# Patient Record
Sex: FEMALE | Race: WHITE | HISPANIC OR LATINO | Employment: OTHER | URBAN - METROPOLITAN AREA
[De-identification: names, ages, dates, MRNs, and addresses within clinical notes are randomized per-mention and may not be internally consistent; named-entity substitution may affect disease eponyms.]

---

## 2024-09-01 ENCOUNTER — HOSPITAL ENCOUNTER (EMERGENCY)
Facility: HOSPITAL | Age: 74
Discharge: HOME | End: 2024-09-02
Attending: EMERGENCY MEDICINE
Payer: COMMERCIAL

## 2024-09-01 ENCOUNTER — APPOINTMENT (OUTPATIENT)
Dept: GENERAL RADIOLOGY | Facility: HOSPITAL | Age: 74
End: 2024-09-01
Payer: COMMERCIAL

## 2024-09-01 VITALS
TEMPERATURE: 98.8 F | SYSTOLIC BLOOD PRESSURE: 142 MMHG | RESPIRATION RATE: 17 BRPM | OXYGEN SATURATION: 95 % | HEIGHT: 60 IN | BODY MASS INDEX: 28.27 KG/M2 | WEIGHT: 144 LBS | DIASTOLIC BLOOD PRESSURE: 79 MMHG | HEART RATE: 103 BPM

## 2024-09-01 DIAGNOSIS — R09.02 HYPOXEMIA: ICD-10-CM

## 2024-09-01 DIAGNOSIS — U07.1 COVID-19: ICD-10-CM

## 2024-09-01 DIAGNOSIS — R91.8 PULMONARY INFILTRATES: ICD-10-CM

## 2024-09-01 DIAGNOSIS — J44.1 ACUTE EXACERBATION OF CHRONIC OBSTRUCTIVE PULMONARY DISEASE (COPD): Primary | ICD-10-CM

## 2024-09-01 LAB
ALBUMIN SERPL-MCNC: 4.3 G/DL (ref 3.5–5.2)
ALBUMIN/GLOB SERPL: 1 G/DL
ALP SERPL-CCNC: 72 U/L (ref 39–117)
ALT SERPL W P-5'-P-CCNC: 7 U/L (ref 1–33)
ANION GAP SERPL CALCULATED.3IONS-SCNC: 13.4 MMOL/L (ref 5–15)
AST SERPL-CCNC: 10 U/L (ref 1–32)
B PARAPERT DNA SPEC QL NAA+PROBE: NOT DETECTED
B PERT DNA SPEC QL NAA+PROBE: NOT DETECTED
BASOPHILS # BLD AUTO: 0.04 10*3/MM3 (ref 0–0.2)
BASOPHILS NFR BLD AUTO: 0.3 % (ref 0–1.5)
BILIRUB SERPL-MCNC: 0.4 MG/DL (ref 0–1.2)
BUN SERPL-MCNC: 18 MG/DL (ref 8–23)
BUN/CREAT SERPL: 17.3 (ref 7–25)
C PNEUM DNA NPH QL NAA+NON-PROBE: NOT DETECTED
CALCIUM SPEC-SCNC: 10.9 MG/DL (ref 8.6–10.5)
CHLORIDE SERPL-SCNC: 95 MMOL/L (ref 98–107)
CO2 SERPL-SCNC: 27.6 MMOL/L (ref 22–29)
CREAT SERPL-MCNC: 1.04 MG/DL (ref 0.57–1)
D-LACTATE SERPL-SCNC: 2.7 MMOL/L (ref 0.5–2)
D-LACTATE SERPL-SCNC: 2.7 MMOL/L (ref 0.5–2)
DEPRECATED RDW RBC AUTO: 46.5 FL (ref 37–54)
EGFRCR SERPLBLD CKD-EPI 2021: 57.2 ML/MIN/1.73
EOSINOPHIL # BLD AUTO: 0.5 10*3/MM3 (ref 0–0.4)
EOSINOPHIL NFR BLD AUTO: 3.7 % (ref 0.3–6.2)
ERYTHROCYTE [DISTWIDTH] IN BLOOD BY AUTOMATED COUNT: 13.6 % (ref 12.3–15.4)
FLUAV SUBTYP SPEC NAA+PROBE: NOT DETECTED
FLUAV SUBTYP SPEC NAA+PROBE: NOT DETECTED
FLUBV RNA ISLT QL NAA+PROBE: NOT DETECTED
FLUBV RNA ISLT QL NAA+PROBE: NOT DETECTED
GLOBULIN UR ELPH-MCNC: 4.4 GM/DL
GLUCOSE SERPL-MCNC: 127 MG/DL (ref 65–99)
HADV DNA SPEC NAA+PROBE: NOT DETECTED
HCOV 229E RNA SPEC QL NAA+PROBE: NOT DETECTED
HCOV HKU1 RNA SPEC QL NAA+PROBE: NOT DETECTED
HCOV NL63 RNA SPEC QL NAA+PROBE: NOT DETECTED
HCOV OC43 RNA SPEC QL NAA+PROBE: NOT DETECTED
HCT VFR BLD AUTO: 40.8 % (ref 34–46.6)
HGB BLD-MCNC: 12.6 G/DL (ref 12–15.9)
HMPV RNA NPH QL NAA+NON-PROBE: NOT DETECTED
HPIV1 RNA ISLT QL NAA+PROBE: NOT DETECTED
HPIV2 RNA SPEC QL NAA+PROBE: NOT DETECTED
HPIV3 RNA NPH QL NAA+PROBE: NOT DETECTED
HPIV4 P GENE NPH QL NAA+PROBE: NOT DETECTED
IMM GRANULOCYTES # BLD AUTO: 0.1 10*3/MM3 (ref 0–0.05)
IMM GRANULOCYTES NFR BLD AUTO: 0.7 % (ref 0–0.5)
LYMPHOCYTES # BLD AUTO: 3.99 10*3/MM3 (ref 0.7–3.1)
LYMPHOCYTES NFR BLD AUTO: 29.4 % (ref 19.6–45.3)
M PNEUMO IGG SER IA-ACNC: NOT DETECTED
MCH RBC QN AUTO: 27.9 PG (ref 26.6–33)
MCHC RBC AUTO-ENTMCNC: 30.9 G/DL (ref 31.5–35.7)
MCV RBC AUTO: 90.3 FL (ref 79–97)
MONOCYTES # BLD AUTO: 0.68 10*3/MM3 (ref 0.1–0.9)
MONOCYTES NFR BLD AUTO: 5 % (ref 5–12)
NEUTROPHILS NFR BLD AUTO: 60.9 % (ref 42.7–76)
NEUTROPHILS NFR BLD AUTO: 8.28 10*3/MM3 (ref 1.7–7)
PLATELET # BLD AUTO: 409 10*3/MM3 (ref 140–450)
PMV BLD AUTO: 9 FL (ref 6–12)
POTASSIUM SERPL-SCNC: 3.9 MMOL/L (ref 3.5–5.2)
PROCALCITONIN SERPL-MCNC: 0.09 NG/ML (ref 0–0.25)
PROT SERPL-MCNC: 8.7 G/DL (ref 6–8.5)
RBC # BLD AUTO: 4.52 10*6/MM3 (ref 3.77–5.28)
RHINOVIRUS RNA SPEC NAA+PROBE: NOT DETECTED
RSV RNA NPH QL NAA+NON-PROBE: NOT DETECTED
SARS-COV-2 RNA NPH QL NAA+NON-PROBE: DETECTED
SARS-COV-2 RNA RESP QL NAA+PROBE: DETECTED
SODIUM SERPL-SCNC: 136 MMOL/L (ref 136–145)
STREP A PCR: NOT DETECTED
WBC NRBC COR # BLD AUTO: 13.59 10*3/MM3 (ref 3.4–10.8)

## 2024-09-01 PROCEDURE — 99285 EMERGENCY DEPT VISIT HI MDM: CPT

## 2024-09-01 PROCEDURE — 71046 X-RAY EXAM CHEST 2 VIEWS: CPT

## 2024-09-01 PROCEDURE — 99285 EMERGENCY DEPT VISIT HI MDM: CPT | Performed by: PHYSICIAN ASSISTANT

## 2024-09-01 PROCEDURE — 85025 COMPLETE CBC W/AUTO DIFF WBC: CPT | Performed by: PHYSICIAN ASSISTANT

## 2024-09-01 PROCEDURE — 96361 HYDRATE IV INFUSION ADD-ON: CPT

## 2024-09-01 PROCEDURE — 87636 SARSCOV2 & INF A&B AMP PRB: CPT | Performed by: EMERGENCY MEDICINE

## 2024-09-01 PROCEDURE — 96375 TX/PRO/DX INJ NEW DRUG ADDON: CPT

## 2024-09-01 PROCEDURE — 25810000003 SODIUM CHLORIDE 0.9 % SOLUTION: Performed by: PHYSICIAN ASSISTANT

## 2024-09-01 PROCEDURE — 36415 COLL VENOUS BLD VENIPUNCTURE: CPT

## 2024-09-01 PROCEDURE — 80053 COMPREHEN METABOLIC PANEL: CPT | Performed by: PHYSICIAN ASSISTANT

## 2024-09-01 PROCEDURE — 83605 ASSAY OF LACTIC ACID: CPT | Performed by: PHYSICIAN ASSISTANT

## 2024-09-01 PROCEDURE — 0202U NFCT DS 22 TRGT SARS-COV-2: CPT | Performed by: PHYSICIAN ASSISTANT

## 2024-09-01 PROCEDURE — 25810000003 SODIUM CHLORIDE 0.9 % SOLUTION 250 ML FLEX CONT: Performed by: PHYSICIAN ASSISTANT

## 2024-09-01 PROCEDURE — 84145 PROCALCITONIN (PCT): CPT | Performed by: PHYSICIAN ASSISTANT

## 2024-09-01 PROCEDURE — 87651 STREP A DNA AMP PROBE: CPT | Performed by: EMERGENCY MEDICINE

## 2024-09-01 PROCEDURE — 25010000002 AZITHROMYCIN PER 500 MG: Performed by: PHYSICIAN ASSISTANT

## 2024-09-01 PROCEDURE — 96365 THER/PROPH/DIAG IV INF INIT: CPT

## 2024-09-01 PROCEDURE — 87040 BLOOD CULTURE FOR BACTERIA: CPT | Performed by: PHYSICIAN ASSISTANT

## 2024-09-01 PROCEDURE — 25010000002 DEXAMETHASONE SODIUM PHOSPHATE 10 MG/ML SOLUTION: Performed by: PHYSICIAN ASSISTANT

## 2024-09-01 RX ORDER — DEXAMETHASONE SODIUM PHOSPHATE 10 MG/ML
6 INJECTION, SOLUTION INTRAMUSCULAR; INTRAVENOUS ONCE
Status: COMPLETED | OUTPATIENT
Start: 2024-09-01 | End: 2024-09-01

## 2024-09-01 RX ORDER — IPRATROPIUM BROMIDE AND ALBUTEROL SULFATE 2.5; .5 MG/3ML; MG/3ML
3 SOLUTION RESPIRATORY (INHALATION) ONCE
Status: COMPLETED | OUTPATIENT
Start: 2024-09-01 | End: 2024-09-01

## 2024-09-01 RX ORDER — ALBUTEROL SULFATE 0.83 MG/ML
2.5 SOLUTION RESPIRATORY (INHALATION) ONCE
Status: COMPLETED | OUTPATIENT
Start: 2024-09-01 | End: 2024-09-01

## 2024-09-01 RX ADMIN — ALBUTEROL SULFATE 2.5 MG: 2.5 SOLUTION RESPIRATORY (INHALATION) at 15:13

## 2024-09-01 RX ADMIN — DEXAMETHASONE SODIUM PHOSPHATE 6 MG: 10 INJECTION, SOLUTION INTRAMUSCULAR; INTRAVENOUS at 15:26

## 2024-09-01 RX ADMIN — SODIUM CHLORIDE 1000 ML: 9 INJECTION, SOLUTION INTRAVENOUS at 17:29

## 2024-09-01 RX ADMIN — IPRATROPIUM BROMIDE AND ALBUTEROL SULFATE 3 ML: .5; 3 SOLUTION RESPIRATORY (INHALATION) at 15:13

## 2024-09-01 RX ADMIN — AZITHROMYCIN MONOHYDRATE 500 MG: 500 INJECTION, POWDER, LYOPHILIZED, FOR SOLUTION INTRAVENOUS at 16:40

## 2024-09-01 NOTE — Clinical Note
Level of Care: Telemetry [5]   Diagnosis: COVID-19 [[id number]]   Admitting Physician: TOBIN MARTINI [[id number]]   Attending Physician: TOBIN MARTINI [[id number]]   Isolate for COVID?: Yes [1]   Certification: I Certify That Inpatient Hospital Services Are Medically Necessary For Greater Than 2 Midnights

## 2024-09-01 NOTE — FSED PROVIDER NOTE
EMERGENCY DEPARTMENT ENCOUNTER    Room Number:  PELON/PELON  Date seen:  9/2/2024  Time seen: 13:39 EDT  PCP: Provider, No Known  Historian: Patient and patient's daughter    Discussed/obtained information from independent historians: Daughter helps with history    HPI:  Chief complaint: Cough, nasal congestion, sore throat  A complete HPI/ROS/PMH/PSH/SH/FH are unobtainable due to: Nothing  Context:Darius Arellano is a 73 y.o. female significant past medical history of type 2 diabetes, hypertension, COPD, dyslipidemia,  asthma who presents to the ED with c/o cough, congestion, sore throat.  Patient was reportedly around 2 grandchildren with similar symptoms 2 weeks ago.  The patient herself has had the symptoms for roughly a week.  They do not seem to be improving.  Cough said to be worse at night in the morning.  There is no shortness of breath, chest pain, palpitations.  Daughter was concerned patient may be developing pneumonia and was brought to the ED for further evaluation.  At present she is treating symptoms with OTC medication.    External (non-ED) record review:  patient seen by Dr. Stubbs/her cardiologist on 9/5/2023.  2D echo revealed LVEF of 60%, mild MR, mild TR, normal PA pressure.  BP was well-controlled.  Echo was reviewed with the patient.  Patient was to continue on current medications and to follow-up in 12 months or sooner should she have any further concerns.    Chronic or social conditions impacting care:    ALLERGIES  Patient has no known allergies.    PAST MEDICAL HISTORY  Active Ambulatory Problems     Diagnosis Date Noted    No Active Ambulatory Problems     Resolved Ambulatory Problems     Diagnosis Date Noted    No Resolved Ambulatory Problems     Past Medical History:   Diagnosis Date    Asthma     Diabetes mellitus        PAST SURGICAL HISTORY  History reviewed. No pertinent surgical history.    FAMILY HISTORY  History reviewed. No pertinent family history.    SOCIAL HISTORY  Social  History     Socioeconomic History    Marital status:        REVIEW OF SYSTEMS  Review of Systems    All systems reviewed and negative except for those discussed in HPI.     PHYSICAL EXAM    I have reviewed the triage vital signs and nursing notes.  Vitals:    09/01/24 2035   BP: 142/79   Pulse: 103   Resp: 17   Temp:    SpO2: 95%     Physical Exam    GENERAL: WDWN female, not distressed  HENT: nares patent.  Nasal mucosal edema.  Oropharynx unremarkable.  No palpable sinus pain.  EYES: no scleral icterus  NECK: no ROM limitations  CV: regular rhythm, regular rate  RESPIRATORY: Diffuse wheezes and ronchi  ABDOMEN: soft, nontender.  : deferred  MUSCULOSKELETAL: no deformity  NEURO: alert, moves all extremities, follows commands  SKIN: warm, dry    LAB RESULTS  Recent Results (from the past 24 hour(s))   Rapid Strep A Screen - Swab, Throat    Collection Time: 09/01/24 12:59 PM    Specimen: Throat; Swab   Result Value Ref Range    STREP A PCR Not Detected Not Detected   COVID-19 and FLU A/B PCR, 1 HR TAT - Swab, Nasopharynx    Collection Time: 09/01/24 12:59 PM    Specimen: Nasopharynx; Swab   Result Value Ref Range    COVID19 Detected (C) Not Detected - Ref. Range    Influenza A PCR Not Detected Not Detected    Influenza B PCR Not Detected Not Detected   Comprehensive Metabolic Panel    Collection Time: 09/01/24  3:30 PM    Specimen: Blood   Result Value Ref Range    Glucose 127 (H) 65 - 99 mg/dL    BUN 18 8 - 23 mg/dL    Creatinine 1.04 (H) 0.57 - 1.00 mg/dL    Sodium 136 136 - 145 mmol/L    Potassium 3.9 3.5 - 5.2 mmol/L    Chloride 95 (L) 98 - 107 mmol/L    CO2 27.6 22.0 - 29.0 mmol/L    Calcium 10.9 (H) 8.6 - 10.5 mg/dL    Total Protein 8.7 (H) 6.0 - 8.5 g/dL    Albumin 4.3 3.5 - 5.2 g/dL    ALT (SGPT) 7 1 - 33 U/L    AST (SGOT) 10 1 - 32 U/L    Alkaline Phosphatase 72 39 - 117 U/L    Total Bilirubin 0.4 0.0 - 1.2 mg/dL    Globulin 4.4 gm/dL    A/G Ratio 1.0 g/dL    BUN/Creatinine Ratio 17.3 7.0 - 25.0     Anion Gap 13.4 5.0 - 15.0 mmol/L    eGFR 57.2 (L) >60.0 mL/min/1.73   Procalcitonin    Collection Time: 09/01/24  3:30 PM    Specimen: Blood   Result Value Ref Range    Procalcitonin 0.09 0.00 - 0.25 ng/mL   CBC Auto Differential    Collection Time: 09/01/24  3:30 PM    Specimen: Blood   Result Value Ref Range    WBC 13.59 (H) 3.40 - 10.80 10*3/mm3    RBC 4.52 3.77 - 5.28 10*6/mm3    Hemoglobin 12.6 12.0 - 15.9 g/dL    Hematocrit 40.8 34.0 - 46.6 %    MCV 90.3 79.0 - 97.0 fL    MCH 27.9 26.6 - 33.0 pg    MCHC 30.9 (L) 31.5 - 35.7 g/dL    RDW 13.6 12.3 - 15.4 %    RDW-SD 46.5 37.0 - 54.0 fl    MPV 9.0 6.0 - 12.0 fL    Platelets 409 140 - 450 10*3/mm3    Neutrophil % 60.9 42.7 - 76.0 %    Lymphocyte % 29.4 19.6 - 45.3 %    Monocyte % 5.0 5.0 - 12.0 %    Eosinophil % 3.7 0.3 - 6.2 %    Basophil % 0.3 0.0 - 1.5 %    Immature Grans % 0.7 (H) 0.0 - 0.5 %    Neutrophils, Absolute 8.28 (H) 1.70 - 7.00 10*3/mm3    Lymphocytes, Absolute 3.99 (H) 0.70 - 3.10 10*3/mm3    Monocytes, Absolute 0.68 0.10 - 0.90 10*3/mm3    Eosinophils, Absolute 0.50 (H) 0.00 - 0.40 10*3/mm3    Basophils, Absolute 0.04 0.00 - 0.20 10*3/mm3    Immature Grans, Absolute 0.10 (H) 0.00 - 0.05 10*3/mm3   Lactic Acid, Plasma    Collection Time: 09/01/24  3:51 PM    Specimen: Blood   Result Value Ref Range    Lactate 2.7 (C) 0.5 - 2.0 mmol/L   Respiratory Panel PCR w/COVID-19(SARS-CoV-2) JIM/ROYCE/BRUCE/PAD/COR/ALEX In-House, NP Swab in UTM/VTM, 2 HR TAT - Swab, Nasopharynx    Collection Time: 09/01/24  5:33 PM    Specimen: Nasopharynx; Swab   Result Value Ref Range    ADENOVIRUS, PCR Not Detected Not Detected    Coronavirus 229E Not Detected Not Detected    Coronavirus HKU1 Not Detected Not Detected    Coronavirus NL63 Not Detected Not Detected    Coronavirus OC43 Not Detected Not Detected    COVID19 Detected (C) Not Detected - Ref. Range    Human Metapneumovirus Not Detected Not Detected    Human Rhinovirus/Enterovirus Not Detected Not Detected    Influenza A  PCR Not Detected Not Detected    Influenza B PCR Not Detected Not Detected    Parainfluenza Virus 1 Not Detected Not Detected    Parainfluenza Virus 2 Not Detected Not Detected    Parainfluenza Virus 3 Not Detected Not Detected    Parainfluenza Virus 4 Not Detected Not Detected    RSV, PCR Not Detected Not Detected    Bordetella pertussis pcr Not Detected Not Detected    Bordetella parapertussis PCR Not Detected Not Detected    Chlamydophila pneumoniae PCR Not Detected Not Detected    Mycoplasma pneumo by PCR Not Detected Not Detected   STAT Lactic Acid, Reflex    Collection Time: 09/01/24  5:57 PM    Specimen: Blood   Result Value Ref Range    Lactate 2.7 (C) 0.5 - 2.0 mmol/L       Ordered the above labs and independently interpreted results.  My findings will be discussed in the ED course or medical decision making section below    RADIOLOGY RESULTS  XR Chest PA & Lateral    Result Date: 9/1/2024  XR CHEST PA AND LATERAL-  DATE OF EXAM: 9/1/2024 1:04 PM  INDICATION: Shortness of breath. Upper respiratory symptoms.  COMPARISON: None available.  TECHNIQUE: PA and lateral views of the chest were obtained.  FINDINGS: Patchy predominantly peripheral groundglass opacities with interstitial thickening in both lungs. No dense lung consolidation. No pneumothorax. Cardiomediastinal contours within normal limits. Mild multilevel thoracic spondylosis. No acute osseous abnormality is identified.      Patchy predominantly peripheral groundglass opacities with interstitial thickening in both lungs, suggesting multifocal atypical pneumonia versus chronic lung disease.  This report was finalized on 9/1/2024 2:10 PM by Getachew Gonzalez MD on Workstation: [id number]        Ordered the above noted radiological studies.  Independently interpreted by me.  My findings will be discussed in the medical decision section below.     PROGRESS, DATA ANALYSIS, CONSULTS AND MEDICAL DECISION MAKING    Please note that this section constitutes my  independent interpretation of clinical data including lab results, radiology, EKG's.  This constitutes my independent professional opinion regarding differential diagnosis and management of this patient.  It may include any factors such as history from outside sources, review of external records, social determinants of health, management of medications, response to those treatments, and discussions with other providers.    ED Course as of 09/02/24 0909   Sun Sep 01, 2024   1339 COVID19(!!): Detected [RC]   1416 SpO2: 91 %  DuoNeb ordered will ambulate the patient with a pulse oximeter and recheck SpO2. [RC]   1558 Sats fall to 86% per RN with ambulation.  Will Admit.  Will hold on abx for now until procal can be obtained.   [RC]   1612 WBC(!): 13.59 [RC]   1613 Neutrophils Absolute(!): 8.28 [RC]   1613 Lactate 2.8.  Will go ahead and cover with Rocephin and Zithromax. [RC]   1631 IMPRESSION:  Patchy predominantly peripheral groundglass opacities with interstitial  thickening in both lungs, suggesting multifocal atypical pneumonia  versus chronic lung disease.     This report was finalized on 9/1/2024 2:10 PM by Getachew Gonzalez MD on  Workstation: [id number]      [RC]   1631 Working diagnosis will be COVID-19, pulmonary infiltrates, leukocytosis, lactic acidosis, hypoxia.  Discussed the patient's case with Dr. Alvarez, to admit to her care for further management. [RC]   1632 CO2: 27.6 [RC]   1632 Anion Gap: 13.4 [RC]      ED Course User Index  [RC] Jono Tanner III, PA     Orders placed during this visit:  Orders Placed This Encounter   Procedures    Critical Care    Rapid Strep A Screen - Swab, Throat    COVID-19 and FLU A/B PCR, 1 HR TAT - Swab, Nasopharynx    Blood Culture - Blood,    Blood Culture - Blood,    Respiratory Panel PCR w/COVID-19(SARS-CoV-2) JIM/ROYCE/BRUCE/PAD/COR/ALEX In-House, NP Swab in UTM/VTM, 2 HR TAT - Swab, Nasopharynx    XR Chest PA & Lateral    Comprehensive Metabolic Panel    Lactic Acid,  Plasma    Procalcitonin    CBC Auto Differential    STAT Lactic Acid, Reflex    STAT Lactic Acid, Reflex    Check Pulse Oximetry while ambulating    Inpatient Admission    CBC & Differential    ED Acknowledgement Form Needed;     Critical Care    Performed by: Jono Tanner III, PA  Authorized by: Lashae Landaverde MD    Critical care provider statement:     Critical care time (minutes):  30    Critical care was necessary to treat or prevent imminent or life-threatening deterioration of the following conditions:  Respiratory failure    Critical care was time spent personally by me on the following activities:  Blood draw for specimens, development of treatment plan with patient or surrogate, discussions with consultants, evaluation of patient's response to treatment, examination of patient, obtaining history from patient or surrogate, ordering and performing treatments and interventions, ordering and review of laboratory studies, ordering and review of radiographic studies, pulse oximetry, re-evaluation of patient's condition, vascular access procedures and review of old charts    I assumed direction of critical care for this patient from another provider in my specialty: yes            Medical Decision Making  Problems Addressed:  Acute exacerbation of chronic obstructive pulmonary disease (COPD): complicated acute illness or injury  COVID-19: complicated acute illness or injury  Hypoxemia: complicated acute illness or injury  Pulmonary infiltrates: complicated acute illness or injury    Amount and/or Complexity of Data Reviewed  Labs: ordered. Decision-making details documented in ED Course.  Radiology: ordered.    Risk  Prescription drug management.  Decision regarding hospitalization.      COVID, flu, strep testing and chest x-ray two-view obtained in triage.  Patient positive for COVID, negative for strep and flu.  Chest x-ray pending.      DIAGNOSIS  Final diagnoses:   Acute exacerbation of chronic  obstructive pulmonary disease (COPD)   Hypoxemia   COVID-19   Pulmonary infiltrates          Medication List      No changes were made to your prescriptions during this visit.         FOLLOW-UP  No follow-up provider specified.      Latest Documented Vital Signs:  As of 09:09 EDT  BP- 142/79 HR- 103 Temp- 98.8 °F (37.1 °C) (Oral) O2 sat- 95%    Appropriate PPE utilized throughout this patient encounter to include mask, if indicated, per current protocol. Hand hygiene was performed before donning PPE and after removal when leaving the room.    Please note that portions of this were completed with a voice recognition program.     Note Disclaimer: At [facility], we believe that sharing information builds trust and better relationships. You are receiving this note because you are receiving care at [facility] or recently visited. It is possible you will see health information before a provider has talked with you about it. This kind of information can be easy to misunderstand. To help you fully understand what it means for your health, we urge you to discuss this note with your provider.

## 2024-09-02 NOTE — ED NOTES
REPORT GIVEN TO Berenice HENRY r.n AFTER PT STILL ON TRACKER BOARD. SENT WITH DAUGHTER POV. ATTEMPTED TO CALL ALL 3 NUMBERS LISTED , NO ANSWER.

## 2024-09-02 NOTE — ED NOTES
This RN attempted to call all numbers listed, no answer. Pt removed from board at this time.     Candy Whitten RN

## 2024-09-02 NOTE — NURSING NOTE
Patient daughter called. Patient had an HMO plan and they are not brining the patient in for treatment. They will be bringing her to [place]. Educated that the providers here recommend she still come in but we cannot make her come to us.    Educated that the patient still has an IV per the charting and RN that gave report. Educated that the needs it removed due to her not going to be having it checked and a risk for infection. Educated on how to remove IV.    Patient is no longer coming per daughter.     RN notified bed board and .

## 2024-09-02 NOTE — NURSING NOTE
9/1 at 2330 report from Bar. Per ORLANDO Hansen patient was sent via private vehicle around 2000 to come to [facility] with directions to come here. Family had asked about taking patient home to change her shirt then come here. Patient has not arrived. Patient is Maltese speaking only.    ORLANDO Hansen states she called all 3 number listed to get a hold of patient. Patient daughter was brining patient. ORLANDO Hansen called son not the daughter.    Patient still has IV per charting.    Patient Maltese speaking only.     9/1 2335 ORLANDO Almanzar called daughter number. Voicemail has not been set up and unable to leave voicemail. RN called patient mobile number listed and not in service. RN called patient home number listed with no answer; left voicemail for patient to call unit. RN called patient son number listed and no answer; left voicemail for there to be a return call to the unit.

## 2024-09-06 LAB
BACTERIA SPEC AEROBE CULT: NORMAL
BACTERIA SPEC AEROBE CULT: NORMAL